# Patient Record
Sex: FEMALE | Race: WHITE
[De-identification: names, ages, dates, MRNs, and addresses within clinical notes are randomized per-mention and may not be internally consistent; named-entity substitution may affect disease eponyms.]

---

## 2019-08-30 NOTE — PDOC.EVN
Event Note





- Event Note


Event Note: 





Recurrent late decels noted, unable to be resolved with resuscitative measures. 

Discussed with Dr. Duvall - will proceed with LTCS.  Terbutaline given.

## 2019-08-30 NOTE — OP
DATE OF PROCEDURE:  2019



PREOPERATIVE DIAGNOSES:  

1. A 38-year-old Latin-American female, G1, P0, at 40 to 41 weeks, spontaneous onset

of labor. 

2. Non-reassuring fetal heart rate tracing remote from delivery at 6 cm with

recurrent late decelerations, unresponsive to medical management trials. 

3. Moderate meconium.



POSTOPERATIVE DIAGNOSES:  

1. A 38-year-old Latin-American female, G1, P0, at 40 to 41 weeks, spontaneous onset

of labor. 

2. Non-reassuring fetal heart rate tracing remote from delivery at 6 cm with

recurrent late decelerations, unresponsive to medical management trials. 

3. Moderate meconium.



PROCEDURE PERFORMED:  Primary low-transverse  section without extension.



ASSISTANT SURGEON:  Cynthia Ramires MD



ANESTHESIA:  Epidural.



ESTIMATED BLOOD LOSS:  400 mL.



COMPLICATIONS:  None.



ANTIBIOTICS:  2 g Ancef, on-call to OR.



FINDINGS:  

1. Male infant, vertex presentation, double nuchal cord noted, 6 pounds 15 ounces

birth weight.  Apgar 7 and 9. 

2. Meconium moderate noted with meconium staining of placental membranes.

3. Normal-appearing fallopian tubes, uterus, and ovaries.



PATHOLOGY:  Placenta.



COUNTS:  Correct x2.



DISPOSITION:  Recovery room, stable.



DESCRIPTION OF PROCEDURE:  The patient was taken back to the operating room for

nonreassuring fetal heart rate tracing.  Her epidural was adequately dosed, and she

was prepped and draped in usual sterile fashion.  A Pfannenstiel incision was made

through the lower abdomen was carried down to the fascia.  Fascia was nicked in the

midline.  Fascial incision was extended bilaterally using curved Guerra scissors.  The

rectus fascia was then dissected superiorly and inferiorly off the rectus muscle

bellies.  The rectus muscle bellies divided in midline, and peritoneal cavity was

entered.  Bladder blade was placed.  A 2-cm hysterotomy incision was made above the

vesicouterine peritoneal fold.  This was extended via finger fractionation.  The

baby was then delivered in the vertex presentation.  The mouth and nares of the

infant were bulb suctioned on the abdomen.  The cord was doubly clamped and cut and

handed to the neonatologist, Dr. Hayes, who was in attendance.  Usual cord

blood and segment for arterial blood gas were sent along with cord blood.  Placenta

was extracted, and uterus was externalized.  The uterus was curetted of any

remaining placental fragments with a dry laparotomy sponge.  Hysterotomy incision

was closed with #1 Monocryl in running locking fashion with good hemostasis noted.

The uterus was placed back in the abdomen.  Pelvis was irrigated and suctioned.

There was some oozing on the right inferior border of the hysterotomy incision, and

additional figure-of-eight stitch was placed with Monocryl securing hemostasis.  The

pelvis again was irrigated and suctioned, hemostasis was confirmed.  The rectus

muscle belly was noted to be hemostatic prior to fascial closure.  The fascia was

closed with 0 PDS suture x2 in a running continuous fashion.  Subcutaneous tissue

was irrigated, noted to be hemostatic prior to skin approximation with staples.  The

surgery was terminated.  There were no anesthetic or surgical complications. 







Job ID:  [id number]

## 2019-08-31 NOTE — PDOC.PP
Post Partum Progress Note


Post Partum Day #: 1


Subjective: 





Doing well.  Pain well controlled.  Mcdonough d/c'd this am, has not attempted to 

urinate.


PO intake tolerated: yes


Ambulation: no


 Vital Signs (12 hours)











  Temp Pulse Resp BP


 


 19 06:10  99.0 F  80  16  107/53 L


 


 19 00:20  99.0 F  79  18  122/57 L








 Weight











Weight                         178 lb

















- Physical Examination


General: NAD


Respiratory: non-labored breathing


Abdominal: lochia (normal), no distention, appropriately TTP


Fundus firm & at: below umbilicus


Skin: CS incision dry & intact, no rash


Neurological: no gross focal deficits


Psychiatric: A&Ox3, normal affect


Result Diagrams: 


 19 06:14





Additional Labs: 


 Post Partum Labs











Blood Type  O POSITIVE   19  12:20    


 


Hep Bs Antigen  Non-Reactive S/CO (NonReactive)   19  10:45    











(1)  delivery delivered


Code(s): O82 - ENCOUNTER FOR  DELIVERY WITHOUT INDICATION   Status: 

Acute   





(2) Fetal heart rate decelerations affecting management of mother


Code(s): O36.8390 - MATERN CARE FOR ABNLT FETL HRT RATE OR RHYM, UNSP TRI, UNSP

   Status: Acute   





- Assessment/Plan





Continue routine post-op care. Encouraged ambulation today. Possible d/c 

tomorrow vs Monday.

## 2019-09-01 NOTE — PDOC.PP
Post Partum Progress Note


Post Partum Day #: 2


Subjective: 





37 yo F s/p pLTCS pp day 2. Pt reports pain well controlled with tylenol and 

motrin. All pp milestones met. 


PO intake tolerated: yes


Flatus: yes


Ambulation: yes


 Vital Signs (12 hours)











  Temp Pulse Resp BP


 


 19 19:55  98.6 F  80  18  114/50 L


 


 19 17:30  98.3 F  72  14  103/51 L








 Weight











Weight                         80.739 kg

















- Physical Examination


General: NAD


Cardiovascular: no m/r/g, RRR


Respiratory: clear to auscultation bilaterally


Abdominal: no distention, appropriately TTP


Skin: CS incision dry & intact


Neurological: no gross focal deficits


Psychiatric: normal affect


Result Diagrams: 


 19 06:14





Additional Labs: 


 Post Partum Labs











Blood Type  O POSITIVE   19  12:20    


 


Hep Bs Antigen  Non-Reactive S/CO (NonReactive)   19  10:45    











(1)  delivery delivered


Code(s): O82 - ENCOUNTER FOR  DELIVERY WITHOUT INDICATION   Status: 

Acute   





- Assessment/Plan





PP day 2 s/p pLTCS


- will plan for dc to home this afternoon


- pain control with prn tramadol ttylenol and motrin


- f/u within the week for staple removal 





Addendum - Attending





- Attending Attestation


Date/Time: 19 [id number]





I personally evaluated the patient and discussed the management with Dr. Real.


I agree with the Assessment and Plan documented above.

## 2019-09-03 NOTE — OP
DATE OF PROCEDURE:  2019



ADDENDUM:  I was present and scrubbed to assist the uncomplicated urgent

low-transverse  for non-reassuring fetal status with Dr. Ana Duvall.

Please see her note for full details. 







Job ID:  [id number]

## 2022-06-14 ENCOUNTER — HOSPITAL ENCOUNTER (OUTPATIENT)
Dept: HOSPITAL 92 - SLEEPLAB | Age: 42
Discharge: HOME | End: 2022-06-14
Attending: FAMILY MEDICINE
Payer: COMMERCIAL

## 2022-06-14 DIAGNOSIS — R53.83: ICD-10-CM

## 2022-06-14 DIAGNOSIS — G47.00: ICD-10-CM

## 2022-06-14 DIAGNOSIS — G47.10: Primary | ICD-10-CM

## 2022-06-14 PROCEDURE — 95800 SLP STDY UNATTENDED: CPT
